# Patient Record
Sex: MALE | Race: WHITE | Employment: OTHER | URBAN - METROPOLITAN AREA
[De-identification: names, ages, dates, MRNs, and addresses within clinical notes are randomized per-mention and may not be internally consistent; named-entity substitution may affect disease eponyms.]

---

## 2018-05-25 ENCOUNTER — APPOINTMENT (OUTPATIENT)
Dept: CT IMAGING | Facility: CLINIC | Age: 48
End: 2018-05-25
Attending: EMERGENCY MEDICINE

## 2018-05-25 ENCOUNTER — HOSPITAL ENCOUNTER (EMERGENCY)
Facility: CLINIC | Age: 48
Discharge: HOME | End: 2018-05-25
Attending: EMERGENCY MEDICINE | Admitting: EMERGENCY MEDICINE

## 2018-05-25 VITALS
HEART RATE: 79 BPM | TEMPERATURE: 98.5 F | OXYGEN SATURATION: 96 % | WEIGHT: 178 LBS | SYSTOLIC BLOOD PRESSURE: 121 MMHG | BODY MASS INDEX: 23.81 KG/M2 | DIASTOLIC BLOOD PRESSURE: 67 MMHG | RESPIRATION RATE: 18 BRPM

## 2018-05-25 DIAGNOSIS — R68.84 JAW PAIN: ICD-10-CM

## 2018-05-25 DIAGNOSIS — K04.7 DENTAL INFECTION: ICD-10-CM

## 2018-05-25 PROCEDURE — 70487 CT MAXILLOFACIAL W/DYE: CPT

## 2018-05-25 PROCEDURE — 99285 EMERGENCY DEPT VISIT HI MDM: CPT | Mod: 25

## 2018-05-25 PROCEDURE — 96375 TX/PRO/DX INJ NEW DRUG ADDON: CPT

## 2018-05-25 PROCEDURE — 25000128 H RX IP 250 OP 636: Performed by: EMERGENCY MEDICINE

## 2018-05-25 PROCEDURE — 96376 TX/PRO/DX INJ SAME DRUG ADON: CPT

## 2018-05-25 PROCEDURE — 96365 THER/PROPH/DIAG IV INF INIT: CPT | Mod: 59

## 2018-05-25 PROCEDURE — 25000125 ZZHC RX 250: Performed by: EMERGENCY MEDICINE

## 2018-05-25 PROCEDURE — 96366 THER/PROPH/DIAG IV INF ADDON: CPT

## 2018-05-25 RX ORDER — KETOROLAC TROMETHAMINE 15 MG/ML
15 INJECTION, SOLUTION INTRAMUSCULAR; INTRAVENOUS ONCE
Status: COMPLETED | OUTPATIENT
Start: 2018-05-25 | End: 2018-05-25

## 2018-05-25 RX ORDER — CLINDAMYCIN PHOSPHATE 900 MG/50ML
900 INJECTION, SOLUTION INTRAVENOUS ONCE
Status: COMPLETED | OUTPATIENT
Start: 2018-05-25 | End: 2018-05-25

## 2018-05-25 RX ORDER — MORPHINE SULFATE 4 MG/ML
4 INJECTION, SOLUTION INTRAMUSCULAR; INTRAVENOUS
Status: DISCONTINUED | OUTPATIENT
Start: 2018-05-25 | End: 2018-05-25 | Stop reason: HOSPADM

## 2018-05-25 RX ORDER — HYDROCODONE BITARTRATE AND ACETAMINOPHEN 5; 325 MG/1; MG/1
1 TABLET ORAL EVERY 4 HOURS PRN
Qty: 18 TABLET | Refills: 0 | Status: SHIPPED | OUTPATIENT
Start: 2018-05-25 | End: 2021-08-01

## 2018-05-25 RX ORDER — IOPAMIDOL 755 MG/ML
500 INJECTION, SOLUTION INTRAVASCULAR ONCE
Status: COMPLETED | OUTPATIENT
Start: 2018-05-25 | End: 2018-05-25

## 2018-05-25 RX ORDER — CLINDAMYCIN HCL 150 MG
300 CAPSULE ORAL 3 TIMES DAILY
Qty: 42 CAPSULE | Refills: 0 | Status: SHIPPED | OUTPATIENT
Start: 2018-05-25 | End: 2018-06-01

## 2018-05-25 RX ADMIN — IOPAMIDOL 80 ML: 755 INJECTION, SOLUTION INTRAVENOUS at 04:42

## 2018-05-25 RX ADMIN — MORPHINE SULFATE 4 MG: 4 INJECTION INTRAVENOUS at 05:03

## 2018-05-25 RX ADMIN — SODIUM CHLORIDE 60 ML: 9 INJECTION, SOLUTION INTRAVENOUS at 04:42

## 2018-05-25 RX ADMIN — CLINDAMYCIN PHOSPHATE 900 MG: 18 INJECTION, SOLUTION INTRAVENOUS at 04:16

## 2018-05-25 RX ADMIN — KETOROLAC TROMETHAMINE 15 MG: 15 INJECTION, SOLUTION INTRAMUSCULAR; INTRAVENOUS at 06:26

## 2018-05-25 RX ADMIN — MORPHINE SULFATE 4 MG: 4 INJECTION INTRAVENOUS at 04:16

## 2018-05-25 ASSESSMENT — ENCOUNTER SYMPTOMS
SORE THROAT: 0
NAUSEA: 0
TROUBLE SWALLOWING: 1
VOMITING: 0
FEVER: 0
CHILLS: 0
FACIAL SWELLING: 0

## 2018-05-25 NOTE — ED AVS SNAPSHOT
" [facility] Emergency Department    [address]    [facility] [address]    Phone:  [phone]    Fax:  [phone]                                       Hardeep Lofton   MRN: [id number]    Department:  [facility] Emergency Department   Date of Visit:  5/25/2018           Patient Information     Date Of Birth          1970        Your diagnoses for this visit were:     Jaw pain     Dental infection        You were seen by Tio Phillip MD.      Follow-up Information     Go to Your dentist that removed wisdom teeth as we discussed this morning.  .        Discharge Instructions         Dental Abscess with Facial Cellulitis    A dental abscess is an infection at the base of a tooth. It means a pocket of pus has formed at the tip of a tooth root in your jaw bone. If the infection isn t treated, it can appear as a swelling on the gum near the tooth. More serious infections spread to the face. This causes your face to swell (cellulitis). This is a very serious condition. Once the swelling begins, it can spread quickly.  A dental abscess usually starts with a crack or cavity in a tooth. The pain is often made worse by drinking hot or cold beverages, or biting on hard foods. The pain may spread from the tooth to your ear or the area of your jaw on the same side.  Home care  Follow these tips when caring for yourself at home:    Don't have hot and cold foods and drinks. Your tooth may be sensitive to changes in temperature. Don t chew on the side of the infected tooth.    If your tooth is chipped or cracked, or if there is a large open cavity, put oil of cloves directly on the tooth to relieve pain. You can buy oil of cloves at drugstores. Some pharmacies carry an over-the-counter \"toothache kit.\" This contains a paste that you can put on the exposed tooth to make it less sensitive.    Put a cold pack on your jaw over the sore area to help reduce pain.    You may use " over-the-counter medication to ease pain, unless another medicine was prescribed. If you have chronic liver or kidney disease, talk with your health care provider before using acetaminophen or ibuprofen. Also talk with your provider if you ve had a stomach ulcer or GI bleeding.    An antibiotic will be prescribed. Take it exactly as directed. Don t miss any doses.  Follow-up care  Follow up with your dentist or an oral surgeon as advised. Severe cases of cellulitis must be checked again within 24 hours. Once an infection occurs in a tooth, it will continue to be a problem until the infection is drained. This is done through surgery or a root canal. Or you may need to have your tooth pulled.  Call 911  Call 911 if any of these occur:    Swelling spreads to the upper half of your face or neck    You eyelids begin to swell shut    Unusual drowsiness    Headache or a stiff neck    Weakness or fainting    Difficulty swallowing or breathing  When to seek medical advice  Call your healthcare provider right away if any of these occur:    Pain gets worse or spreads to your neck    Fever of 100.4 F (38 C) or higher, or as directed by your healthcare provider  Date Last Reviewed: 10/1/2016    2784-6099 [employer]. [address] [place] [address]. All rights reserved. This information is not intended as a substitute for professional medical care. Always follow your healthcare professional's instructions.          24 Hour Appointment Hotline       To make an appointment at any [facility], call [phone] ([phone]). If you don't have a family doctor or clinic, we will help you find one. [facility] are conveniently located to serve the needs of you and your family.             Review of your medicines      START taking        Dose / Directions Last dose taken    clindamycin 150 MG capsule   Commonly known as:  CLEOCIN   Dose:  300 mg   Quantity:  42 capsule        Take 2 capsules (300  mg) by mouth 3 times daily for 7 days   Refills:  0        HYDROcodone-acetaminophen 5-325 MG per tablet   Commonly known as:  NORCO   Dose:  1 tablet   Quantity:  18 tablet        Take 1 tablet by mouth every 4 hours as needed for pain   Refills:  0          Our records show that you are taking the medicines listed below. If these are incorrect, please call your family doctor or clinic.        Dose / Directions Last dose taken    azithromycin 500 MG tablet   Commonly known as:  ZITHROMAX   Quantity:  2 Tab        2 tablets once   Refills:  0        metroNIDAZOLE 500 MG tablet   Commonly known as:  FLAGYL   Quantity:  4 Tab        4 tablets times one   Refills:  0                Information about OPIOIDS     PRESCRIPTION OPIOIDS: WHAT YOU NEED TO KNOW   You have a prescription for an opioid (narcotic) pain medicine. Opioids can cause addiction. If you have a history of chemical dependency of any type, you are at a higher risk of becoming addicted to opioids. Only take this medicine after all other options have been tried. Take it for as short a time and as few doses as possible.     Do not:    Drive. If you drive while taking these medicines, you could be arrested for driving under the influence (DUI).    Operate heavy machinery    Do any other dangerous activities while taking these medicines.     Drink any alcohol while taking these medicines.      Take with any other medicines that contain acetaminophen. Read all labels carefully. Look for the word  acetaminophen  or  Tylenol.  Ask your pharmacist if you have questions or are unsure.    Store your pills in a secure place, locked if possible. We will not replace any lost or stolen medicine. If you don t finish your medicine, please throw away (dispose) as directed by your pharmacist. The [place] [employer] has more information about safe disposal: https://www.pca.[facility].[place].us/living-green/managing-unwanted-medications    All opioids tend to cause  constipation. Drink plenty of water and eat foods that have a lot of fiber, such as fruits, vegetables, prune juice, apple juice and high-fiber cereal. Take a laxative (Miralax, milk of magnesia, Colace, Senna) if you don t move your bowels at least every other day.         Prescriptions were sent or printed at these locations (2 Prescriptions)                   Other Prescriptions                Printed at Department/Unit printer (2 of 2)         clindamycin (CLEOCIN) 150 MG capsule               HYDROcodone-acetaminophen (NORCO) 5-325 MG per tablet                Procedures and tests performed during your visit     CT Maxillofacial w Contrast      Orders Needing Specimen Collection     None      Pending Results     Date and Time Order Name Status Description    5/25/2018 0353 CT Maxillofacial w Contrast In process             Pending Culture Results     No orders found from 5/23/2018 to 5/26/2018.            Pending Results Instructions     If you had any lab results that were not finalized at the time of your Discharge, you can call the ED Lab Result RN at [phone]. You will be contacted by this team for any positive Lab results or changes in treatment. The nurses are available 7 days a week from 10A to 6:30P.  You can leave a message 24 hours per day and they will return your call.        Test Results From Your Hospital Stay        5/25/2018  4:39 AM      Result not yet available     Exam Ended                Clinical Quality Measure: Blood Pressure Screening     Your blood pressure was checked while you were in the emergency department today. The last reading we obtained was  BP: 121/67 . Please read the guidelines below about what these numbers mean and what you should do about them.  If your systolic blood pressure (the top number) is less than 120 and your diastolic blood pressure (the bottom number) is less than 80, then your blood pressure is normal. There is nothing more that you need to do about  "it.  If your systolic blood pressure (the top number) is 120-139 or your diastolic blood pressure (the bottom number) is 80-89, your blood pressure may be higher than it should be. You should have your blood pressure rechecked within a year by a primary care provider.  If your systolic blood pressure (the top number) is 140 or greater or your diastolic blood pressure (the bottom number) is 90 or greater, you may have high blood pressure. High blood pressure is treatable, but if left untreated over time it can put you at risk for heart attack, stroke, or kidney failure. You should have your blood pressure rechecked by a primary care provider within the next 4 weeks.  If your provider in the emergency department today gave you specific instructions to follow-up with your doctor or provider even sooner than that, you should follow that instruction and not wait for up to 4 weeks for your follow-up visit.        Thank you for choosing [facility]       Thank you for choosing [facility] for your care. Our goal is always to provide you with excellent care. Hearing back from our patients is one way we can continue to improve our services. Please take a few minutes to complete the written survey that you may receive in the mail after you visit with us. Thank you!        [employer] Information     [employer] lets you send messages to your doctor, view your test results, renew your prescriptions, schedule appointments and more. To sign up, go to [email or web address] . Click on \"Log in\" on the left side of the screen, which will take you to the Welcome page. Then click on \"Sign up Now\" on the right side of the page.     You will be asked to enter the access code listed below, as well as some personal information. Please follow the directions to create your username and password.     Your access code is: [email or web address]  Expires: 2018  6:29 AM     Your access code will  in 90 days. If you need help or a new code, please call " your [place] clinic or [phone].        Care EveryWhere ID     This is your Care EveryWhere ID. This could be used by other organizations to access your [place] medical records  [id number]        Equal Access to Services     LOAN DILL : Carly Norton, wamariangelda luqadaha, qaybta kaalmada micheal, phillip kinsey. So [facility] [phone].    ATENCIÓN: Si habla español, tiene a martin disposición servicios gratuitos de asistencia lingüística. Llame al [phone].    We comply with applicable federal civil rights laws and [place] laws. We do not discriminate on the basis of race, color, national origin, age, disability, sex, sexual orientation, or gender identity.            After Visit Summary       This is your record. Keep this with you and show to your community pharmacist(s) and doctor(s) at your next visit.

## 2018-05-25 NOTE — DISCHARGE INSTRUCTIONS
"  Dental Abscess with Facial Cellulitis    A dental abscess is an infection at the base of a tooth. It means a pocket of pus has formed at the tip of a tooth root in your jaw bone. If the infection isn t treated, it can appear as a swelling on the gum near the tooth. More serious infections spread to the face. This causes your face to swell (cellulitis). This is a very serious condition. Once the swelling begins, it can spread quickly.  A dental abscess usually starts with a crack or cavity in a tooth. The pain is often made worse by drinking hot or cold beverages, or biting on hard foods. The pain may spread from the tooth to your ear or the area of your jaw on the same side.  Home care  Follow these tips when caring for yourself at home:    Don't have hot and cold foods and drinks. Your tooth may be sensitive to changes in temperature. Don t chew on the side of the infected tooth.    If your tooth is chipped or cracked, or if there is a large open cavity, put oil of cloves directly on the tooth to relieve pain. You can buy oil of cloves at [employer]. Some pharmacies carry an over-the-counter \"toothache kit.\" This contains a paste that you can put on the exposed tooth to make it less sensitive.    Put a cold pack on your jaw over the sore area to help reduce pain.    You may use over-the-counter medication to ease pain, unless another medicine was prescribed. If you have chronic liver or kidney disease, talk with your health care provider before using acetaminophen or ibuprofen. Also talk with your provider if you ve had a stomach ulcer or GI bleeding.    An antibiotic will be prescribed. Take it exactly as directed. Don t miss any doses.  Follow-up care  Follow up with your dentist or an oral surgeon as advised. Severe cases of cellulitis must be checked again within 24 hours. Once an infection occurs in a tooth, it will continue to be a problem until the infection is drained. This is done through surgery or a " root canal. Or you may need to have your tooth pulled.  Call 911  Call 911 if any of these occur:    Swelling spreads to the upper half of your face or neck    You eyelids begin to swell shut    Unusual drowsiness    Headache or a stiff neck    Weakness or fainting    Difficulty swallowing or breathing  When to seek medical advice  Call your healthcare provider right away if any of these occur:    Pain gets worse or spreads to your neck    Fever of 100.4 F (38 C) or higher, or as directed by your healthcare provider  Date Last Reviewed: 10/1/2016    3505-9762 [employer]. [address] [place] [address]. All rights reserved. This information is not intended as a substitute for professional medical care. Always follow your healthcare professional's instructions.

## 2018-05-25 NOTE — ED PROVIDER NOTES
History     Chief Complaint:  Dental Pain    HPI   Hardeep Lofton is a 47 year old male who presents with lower dental pain. The patient states that 3 days ago he had his bilateral wisdom teeth removed and following this he has had some post operative pain. However, this pain has progressively worsened to the point where he cannot fully open his jaw or swallow without provoking significant pain. He was unable to sleep due to pain and presents here this morning for evaluation. He has not been able to see his dentist as he is out of town currently. He was seen yesterday and was started on Azithromycin for a possible infection. He otherwise does not have significant facial swelling, fevers, sore throat, nausea, or vomiting. He has not had bleeding from the surgical site.    Allergies:  No known drug allergies     Medications:    Azithromycin      Past Medical History:    The patient does not have any past pertinent medical history.     Past Surgical History:    Left knee surgery     Family History:    History reviewed. No pertinent family history.      Social History:  Smoking Status: Never Smoker  Alcohol Use: Yes  Patient presents with alone.   Marital Status:        Review of Systems   Constitutional: Negative for chills and fever.   HENT: Positive for dental problem and trouble swallowing. Negative for facial swelling and sore throat.    Gastrointestinal: Negative for nausea and vomiting.       Physical Exam   Patient Vitals for the past 24 hrs:   BP Temp Temp src Pulse Resp SpO2 Weight   05/25/18 0515 - - - - - 94 % -   05/25/18 0339 121/67 98.5  F (36.9  C) Temporal 79 18 96 % 80.7 kg (178 lb)      Physical Exam  Constitutional: Patient is well appearing. No distress.  Head: Atraumatic.  Mouth/Throat: Oropharynx is clear and moist. No oropharyngeal exudate. Mouth opening limited to 2 cm. Left lower jaw pain greater than right. Minimal visualization of posterior molar healing beds. Does not appear to have  argenis abscess, bleeding, or drainage at this time. No sublingual swelling or tenderness.  Eyes: Conjunctivae and EOM are normal. No scleral icterus.  Neck: Normal range of motion. Neck supple.   Cardiovascular: Normal rate, regular rhythm, normal heart sounds and intact distal pulses.   Pulmonary/Chest: Breath sounds normal. No respiratory distress.  Abdominal: Soft. Bowel sounds are normal. No distension. No tenderness. No rebound or guarding.   Musculoskeletal: Normal range of motion. No edema or tenderness.   Neurological: Alert and orientated to person, place, and time. No observable focal neuro deficit  Skin: Warm and dry. No rash noted. Not diaphoretic.        Emergency Department Course     Imaging:  Radiographic findings were communicated with the patient who voiced understanding of the findings.    CT-scan Maxillofacial w/ contrast:  1. Evidence of recent bilateral lower wisdom teeth extraction.  2. Extensive edema suggestive of infectious/inflammatory process is noted involving the surrounding soft tissues with extension into the left submandibular space, the parapharyngeal space and along the left aspect of the naso-,aida-, and hypo-pharyngeal walls. There is some effacement of the airway which remains patent.   3. No delayed imaging was performed limiting evaluation for abscess, however, extending from the lingual cortex at the site of the left lower wisdom tooth extraction there is a somewhat concentrated area of low-attenuation change with some mild peripheral enhancement. No definite well-formed capsule is identified at this time to suggest a mature abscess but possibility of an early or developing abscess should be considered.   4. There is reactive lymphadenopathy along the cervical chains, left greater than right.   As read by Radiology.      Interventions:  0416 - Clindamycin 900 mg IV  0416 - Morphine 4 mg IV   0503 - Morphine 4 mg IV  0626 - Toradol 15 mg IV    Emergency Department Course:  Past  medical records, nursing notes, and vitals reviewed.  0348: I performed an exam of the patient and obtained history, as documented above.   The patient was sent for a CT-scan while in the emergency department, findings above.     0620: I rechecked the patient. Explained findings to patient.     I rechecked the patient. Findings and plan explained to the Patient. Patient discharged home with instructions regarding supportive care, medications, and reasons to return. The importance of close follow-up was reviewed.      Impression & Plan      Medical Decision Making:  Post wisdom teeth extraction.  No airway compromise but some mild trismus.  CT of face as above does show probably secondary infection without argenis abscess.  We will switch him form z pack to clinda.  IV dose here and pain controlled here.  [place] dentist available or [facility] [place] feels comfortable with home with plan and will follow up with dentist this am when clinic opens.    Diagnosis:    ICD-10-CM   1. Jaw pain R68.84   2. Dental infection K04.7     Discharge Medications:  New Prescriptions    CLINDAMYCIN (CLEOCIN) 150 MG CAPSULE    Take 2 capsules (300 mg) by mouth 3 times daily for 7 days    HYDROCODONE-ACETAMINOPHEN (NORCO) 5-325 MG PER TABLET    Take 1 tablet by mouth every 4 hours as needed for pain       Cody Parker  5/25/2018   [facility] EMERGENCY DEPARTMENT  I, Cody Parker, am serving as a scribe at 3:48 AM on 5/25/2018 to document services personally performed by Tio Phillip MD based on my observations and the provider's statements to me.       Tio Phillip MD  05/25/18 6957

## 2018-05-25 NOTE — ED NOTES
47-year-old male presents to the ER with complaints of dental pain after having his wisdom teeth taken out on Tuesday. Pt states he hasn't slept for the past three days.

## 2018-05-25 NOTE — ED AVS SNAPSHOT
[facility] Emergency Department    [address]    [facility] [address]    Phone:  [phone]    Fax:  [phone]                                       Hardeep Lofton   MRN: [id number]    Department:  [facility] Emergency Department   Date of Visit:  5/25/2018           After Visit Summary Signature Page     I have received my discharge instructions, and my questions have been answered. I have discussed any challenges I see with this plan with the nurse or doctor.    ..........................................................................................................................................  Patient/Patient Representative Signature      ..........................................................................................................................................  Patient Representative Print Name and Relationship to Patient    ..................................................               ................................................  Date                                            Time    ..........................................................................................................................................  Reviewed by Signature/Title    ...................................................              ..............................................  Date                                                            Time

## 2021-07-31 PROCEDURE — 80048 BASIC METABOLIC PNL TOTAL CA: CPT | Performed by: EMERGENCY MEDICINE

## 2021-07-31 PROCEDURE — 99285 EMERGENCY DEPT VISIT HI MDM: CPT | Mod: 25

## 2021-07-31 PROCEDURE — 96376 TX/PRO/DX INJ SAME DRUG ADON: CPT

## 2021-07-31 PROCEDURE — 36415 COLL VENOUS BLD VENIPUNCTURE: CPT | Performed by: EMERGENCY MEDICINE

## 2021-07-31 PROCEDURE — 85025 COMPLETE CBC W/AUTO DIFF WBC: CPT | Performed by: EMERGENCY MEDICINE

## 2021-07-31 PROCEDURE — 96374 THER/PROPH/DIAG INJ IV PUSH: CPT

## 2021-07-31 PROCEDURE — 96375 TX/PRO/DX INJ NEW DRUG ADDON: CPT

## 2021-07-31 PROCEDURE — 96361 HYDRATE IV INFUSION ADD-ON: CPT

## 2021-07-31 RX ORDER — KETOROLAC TROMETHAMINE 15 MG/ML
15 INJECTION, SOLUTION INTRAMUSCULAR; INTRAVENOUS ONCE
Status: COMPLETED | OUTPATIENT
Start: 2021-08-01 | End: 2021-08-01

## 2021-08-01 ENCOUNTER — HOSPITAL ENCOUNTER (EMERGENCY)
Facility: CLINIC | Age: 51
Discharge: HOME | End: 2021-08-01
Attending: EMERGENCY MEDICINE | Admitting: EMERGENCY MEDICINE
Payer: COMMERCIAL

## 2021-08-01 ENCOUNTER — APPOINTMENT (OUTPATIENT)
Dept: CT IMAGING | Facility: CLINIC | Age: 51
End: 2021-08-01
Attending: EMERGENCY MEDICINE
Payer: COMMERCIAL

## 2021-08-01 VITALS
SYSTOLIC BLOOD PRESSURE: 129 MMHG | DIASTOLIC BLOOD PRESSURE: 85 MMHG | HEART RATE: 54 BPM | OXYGEN SATURATION: 97 % | TEMPERATURE: 98.4 F | RESPIRATION RATE: 20 BRPM

## 2021-08-01 DIAGNOSIS — N20.1 URETERAL STONE: ICD-10-CM

## 2021-08-01 LAB
ALBUMIN UR-MCNC: 20 MG/DL
ANION GAP SERPL CALCULATED.3IONS-SCNC: 5 MMOL/L (ref 3–14)
APPEARANCE UR: CLEAR
BASOPHILS # BLD AUTO: 0 10E3/UL (ref 0–0.2)
BASOPHILS NFR BLD AUTO: 0 %
BILIRUB UR QL STRIP: NEGATIVE
BUN SERPL-MCNC: 28 MG/DL (ref 7–30)
CALCIUM SERPL-MCNC: 9.1 MG/DL (ref 8.5–10.1)
CHLORIDE BLD-SCNC: 105 MMOL/L (ref 94–109)
CO2 SERPL-SCNC: 28 MMOL/L (ref 20–32)
COLOR UR AUTO: YELLOW
CREAT SERPL-MCNC: 1.14 MG/DL (ref 0.66–1.25)
EOSINOPHIL # BLD AUTO: 0.1 10E3/UL (ref 0–0.7)
EOSINOPHIL NFR BLD AUTO: 2 %
ERYTHROCYTE [DISTWIDTH] IN BLOOD BY AUTOMATED COUNT: 11.9 % (ref 10–15)
GFR SERPL CREATININE-BSD FRML MDRD: 74 ML/MIN/1.73M2
GLUCOSE BLD-MCNC: 115 MG/DL (ref 70–99)
GLUCOSE UR STRIP-MCNC: NEGATIVE MG/DL
HCT VFR BLD AUTO: 45.6 % (ref 40–53)
HGB BLD-MCNC: 15.1 G/DL (ref 13.3–17.7)
HGB UR QL STRIP: ABNORMAL
HOLD SPECIMEN: NORMAL
IMM GRANULOCYTES # BLD: 0 10E3/UL
IMM GRANULOCYTES NFR BLD: 0 %
KETONES UR STRIP-MCNC: NEGATIVE MG/DL
LEUKOCYTE ESTERASE UR QL STRIP: NEGATIVE
LYMPHOCYTES # BLD AUTO: 2 10E3/UL (ref 0.8–5.3)
LYMPHOCYTES NFR BLD AUTO: 30 %
MCH RBC QN AUTO: 32.1 PG (ref 26.5–33)
MCHC RBC AUTO-ENTMCNC: 33.1 G/DL (ref 31.5–36.5)
MCV RBC AUTO: 97 FL (ref 78–100)
MONOCYTES # BLD AUTO: 0.7 10E3/UL (ref 0–1.3)
MONOCYTES NFR BLD AUTO: 11 %
MUCOUS THREADS #/AREA URNS LPF: PRESENT /LPF
NEUTROPHILS # BLD AUTO: 3.9 10E3/UL (ref 1.6–8.3)
NEUTROPHILS NFR BLD AUTO: 57 %
NITRATE UR QL: NEGATIVE
NRBC # BLD AUTO: 0 10E3/UL
NRBC BLD AUTO-RTO: 0 /100
PH UR STRIP: 5.5 [PH] (ref 5–7)
PLATELET # BLD AUTO: 229 10E3/UL (ref 150–450)
POTASSIUM BLD-SCNC: 4.3 MMOL/L (ref 3.4–5.3)
RBC # BLD AUTO: 4.7 10E6/UL (ref 4.4–5.9)
RBC URINE: >182 /HPF
SODIUM SERPL-SCNC: 138 MMOL/L (ref 133–144)
SP GR UR STRIP: 1.03 (ref 1–1.03)
UROBILINOGEN UR STRIP-MCNC: NORMAL MG/DL
WBC # BLD AUTO: 6.8 10E3/UL (ref 4–11)
WBC URINE: 5 /HPF

## 2021-08-01 PROCEDURE — 250N000011 HC RX IP 250 OP 636: Performed by: EMERGENCY MEDICINE

## 2021-08-01 PROCEDURE — 81001 URINALYSIS AUTO W/SCOPE: CPT | Performed by: EMERGENCY MEDICINE

## 2021-08-01 PROCEDURE — 74176 CT ABD & PELVIS W/O CONTRAST: CPT

## 2021-08-01 PROCEDURE — 258N000003 HC RX IP 258 OP 636: Performed by: EMERGENCY MEDICINE

## 2021-08-01 RX ORDER — ONDANSETRON 4 MG/1
4 TABLET, ORALLY DISINTEGRATING ORAL EVERY 8 HOURS PRN
Qty: 10 TABLET | Refills: 0 | Status: SHIPPED | OUTPATIENT
Start: 2021-08-01 | End: 2021-08-04

## 2021-08-01 RX ORDER — HYDROCODONE BITARTRATE AND ACETAMINOPHEN 5; 325 MG/1; MG/1
1-2 TABLET ORAL EVERY 4 HOURS PRN
Qty: 10 TABLET | Refills: 0 | Status: SHIPPED | OUTPATIENT
Start: 2021-08-01 | End: 2021-08-04

## 2021-08-01 RX ORDER — TAMSULOSIN HYDROCHLORIDE 0.4 MG/1
0.4 CAPSULE ORAL DAILY
Qty: 10 CAPSULE | Refills: 0 | Status: SHIPPED | OUTPATIENT
Start: 2021-08-01 | End: 2021-08-11

## 2021-08-01 RX ORDER — ONDANSETRON 2 MG/ML
4 INJECTION INTRAMUSCULAR; INTRAVENOUS ONCE
Status: COMPLETED | OUTPATIENT
Start: 2021-08-01 | End: 2021-08-01

## 2021-08-01 RX ORDER — HYDROMORPHONE HYDROCHLORIDE 1 MG/ML
0.5 INJECTION, SOLUTION INTRAMUSCULAR; INTRAVENOUS; SUBCUTANEOUS
Status: DISCONTINUED | OUTPATIENT
Start: 2021-08-01 | End: 2021-08-01 | Stop reason: HOSPADM

## 2021-08-01 RX ADMIN — KETOROLAC TROMETHAMINE 15 MG: 15 INJECTION, SOLUTION INTRAMUSCULAR; INTRAVENOUS at 00:31

## 2021-08-01 RX ADMIN — HYDROMORPHONE HYDROCHLORIDE 0.5 MG: 1 INJECTION, SOLUTION INTRAMUSCULAR; INTRAVENOUS; SUBCUTANEOUS at 01:27

## 2021-08-01 RX ADMIN — ONDANSETRON 4 MG: 2 INJECTION INTRAMUSCULAR; INTRAVENOUS at 01:27

## 2021-08-01 RX ADMIN — HYDROMORPHONE HYDROCHLORIDE 0.5 MG: 1 INJECTION, SOLUTION INTRAMUSCULAR; INTRAVENOUS; SUBCUTANEOUS at 03:33

## 2021-08-01 RX ADMIN — SODIUM CHLORIDE 1000 ML: 9 INJECTION, SOLUTION INTRAVENOUS at 01:27

## 2021-08-01 ASSESSMENT — ENCOUNTER SYMPTOMS
FLANK PAIN: 1
NAUSEA: 1
SHORTNESS OF BREATH: 0
DIFFICULTY URINATING: 1

## 2021-08-01 NOTE — ED PROVIDER NOTES
History   Chief Complaint:  Flank Pain       The history is provided by the patient.      Hardeep Lofton is a 51 year old male with who presents with flank pain. Patient states sudden onset of left flank pain that started about 45 minutes prior to arrival. Pain radiates to the left groin. Patient also notes decreased urine and nausea. Denies shortness of breath or chest pain. No history of kidney stone.       Review of Systems   Respiratory: Negative for shortness of breath.    Cardiovascular: Negative for chest pain.   Gastrointestinal: Positive for nausea.   Genitourinary: Positive for difficulty urinating and flank pain.   All other systems reviewed and are negative.        Allergies:  The patient has no known allergies.     Medications:  Adderall    Past Medical History:    ADHD     Past Surgical History:    Knee surgery    Family History:    Father: Alzheimer's disease, hyperlipidemia     Social History:  Patient was accompanied to the ED.     Physical Exam     Patient Vitals for the past 24 hrs:   BP Temp Temp src Pulse Resp SpO2   08/01/21 0115 (!) 140/96 -- -- 52 -- 98 %   08/01/21 0100 (!) 131/90 -- -- 53 -- 99 %   07/31/21 2326 123/75 98.4  F (36.9  C) Oral 66 20 99 %       Physical Exam    Gen: alert  HEENT: PERRL, oropharynx clear  Neck: normal ROM  CV: RRR, no murmurs  Pulm: breath sounds equal, lungs clear  Abd: Soft, mild LLQ tenderness, otherwise nontender  Back: no evidence of injury, no cva tenderness  MSK: no deformity, moves all extremities  Skin: no rash  Neuro: alert, appropriate conversation and interaction    Emergency Department Course     Imaging:  Abd/pelvis CT no contrast - Stone Protocol  1.  Moderate left hydronephrosis caused by a 4 mm ureterovesical junction stone.  2.  Single small left intrarenal stone.  Reading per radiology.     Laboratory:   CBC: WBC: 6.8, HGB: 15.1, PLT: 229    BMP: Glucose 115 (H) o/w WNL (Creatinine: 1.14)    UA: Blood: Large (A), Protein Albumin: 20 (A),  RBC: >182 (H), Mucous: Present, o/w Negative      Emergency Department Course:    Reviewed:  I reviewed nursing notes, vitals, past medical history and care everywhere    Assessments:  0117 I obtained history and examined the patient as noted above.   0229 I rechecked the patient and explained findings.     Interventions:  0031 Toradol 15 mg IV  0127 Dilaudid 0.5 mg IV  0127 Zofran 4 mg IV  012 NS 1L IV    Disposition:  The patient was discharged to home.       Impression & Plan     CMS Diagnoses: None    Medical Decision Making:  Evaluation today was consistent with nephrolithiasis and renal colic.  This explains the patient's pain.  Based on hx and exam and the above studies, I do not feel that there is evidence of other acute intraabdominal process at this time.  CT scan showed ueteral stone.  UA was obtained and no overt signs of infection are present.  Given the presence of the kidney stone, urine culture was sent.  The patient received pain control, antiemetic and IV fluids as documented above.  Pain and nausea was well controlled with these measures.  The patient was able to tolerate PO.  Based on the patient's good response to symptomatic control, I feel that this patient can be managed expectantly with close outpatient follow up within the next 2 days.   The patient was instructed to return to the emergency department for uncontrolled pain, fever or inability to tolerate oral liquids.  Prescriptions for Norco, Zofran, and Flomax were given.    Diagnosis:    ICD-10-CM    1. Ureteral stone  N20.1        Discharge Medications:  New Prescriptions    HYDROCODONE-ACETAMINOPHEN (NORCO) 5-325 MG TABLET    Take 1-2 tablets by mouth every 4 hours as needed for severe pain    ONDANSETRON (ZOFRAN ODT) 4 MG ODT TAB    Take 1 tablet (4 mg) by mouth every 8 hours as needed for nausea or vomiting    TAMSULOSIN (FLOMAX) 0.4 MG CAPSULE    Take 1 capsule (0.4 mg) by mouth daily for 10 doses       Scribe Disclosure:  Antonella FLORIAN  Mayra, am serving as a scribe at 12:58 AM on 8/1/2021 to document services personally performed by Juliana Bautista MD, based on my observations and the provider's statements to me.            Juliana Bautista MD  08/01/21 0430

## 2021-08-01 NOTE — ED NOTES
Pt provided w/ strainer for straining urine for stone at home, process explained to pt, verbalized understanding.

## 2021-08-01 NOTE — ED TRIAGE NOTES
Pt states sudden onset of left flank pain radiating to groin approx 45 minutes pta. Pt also notes nausea. ABCs intact GCS 15

## 2021-08-01 NOTE — DISCHARGE INSTRUCTIONS
Take ibuprofen 600 mg 3x per day.  This will provide both pain control and fight against inflammation.  You were given a prescription for norco (hydrocodone and acetaminophen).  This is a strong, narcotic pain medication.  It may cause drowsiness and mild changes in cognition.  Do not drive or operate machinery while taking this medication.  Do not mix this medication with alcohol or other sedating medications.  This medication contains tylenol (acetaminophen) therefore do not take additional tylenol (acetaminophen) while taking norco.  This medication can cause constipation.  The use of over the counter laxatives and stool softeners can help prevent this.      Discharge Instructions  Kidney Stones    Kidney stones are a common problem that can cause a lot of pain but fortunately are usually not dangerous. Kidney stones form in the kidney and then can cause a blockage (obstruction) of the flow of urine from the kidney which leads to pain. Most patients can manage kidney stones at home (without a hospital stay).  However, sometimes your condition may be worse than it seemed at first, or may get worse with time. Most kidney stones will pass on their own, but occasionally stones may need to be removed by an urologist.    Generally, every Emergency Department visit should have a follow-up clinic visit with either a primary or a specialty clinic/provider. Please follow-up as instructed by your emergency provider today.      Return to the Emergency Department if:  Your pain is not controlled despite the medications provided or recommended.  You are vomiting (throwing up) and cannot keep fluids or medications down.  You develop a fever (>100.4 F).  You feel much more ill or develop new symptoms.  What can I do to help myself?  Be sure to drink plenty of fluids.  If instructed to do so, strain your urine (pee) with the urine strainer you were provided with today. Your stone may look like a grain of sand or a small pebble.  Collect any stones in the cup provided and bring to your follow-up appointment.  Staying active is good, and may help the stone to pass. You may do whatever you feel up to doing without restrictions.   Treatment:  Non-steroidal anti-inflammatory drugs (NSAIDs). This includes prescription medicines like Toradol  (ketorolac) and non-prescription medicines like Advil  (ibuprofen) and Nuprin  (ibuprofen) and Naproxen. These pain relievers are very effective for kidney stones.  Nausea (sick to your stomach) medication.  Nausea and vomiting are common with kidney stones, so your provider may send you home with medicine for this.   Flomax  (tamsulosin). This medicine is sometimes used for men with prostate problems, but also can help kidney stones to pass. Its effectiveness is controversial or questionable so it is prescribed in certain situations. This medicine can lower blood pressure, and you may feel faint/lightheaded, especially when you first stand up. Be sure to get up gradually, sit down if you feel faint, and avoid activity where feeling faint would be dangerous, such as climbing ladders.  If you were given a prescription for medicine here today, be sure to read all of the information (including the package insert) that comes with your prescription.  This will include important information about the medicine, its side effects, and any warnings that you need to know about.  The pharmacist who fills the prescription can provide more information and answer questions you may have about the medicine.  If you have questions or concerns that the pharmacist cannot address, please call or return to the Emergency Department.   Remember that you can always come back to the Emergency Department if you are not able to see your regular provider in the amount of time listed above, if you get any new symptoms, or if there is anything that worries you.

## 2021-08-19 ENCOUNTER — HOSPITAL ENCOUNTER (EMERGENCY)
Facility: CLINIC | Age: 51
Discharge: HOME | End: 2021-08-19
Attending: EMERGENCY MEDICINE | Admitting: EMERGENCY MEDICINE
Payer: COMMERCIAL

## 2021-08-19 VITALS
OXYGEN SATURATION: 100 % | DIASTOLIC BLOOD PRESSURE: 78 MMHG | HEART RATE: 60 BPM | SYSTOLIC BLOOD PRESSURE: 114 MMHG | RESPIRATION RATE: 18 BRPM | TEMPERATURE: 98.1 F

## 2021-08-19 DIAGNOSIS — T14.8XXA ABRASION: ICD-10-CM

## 2021-08-19 DIAGNOSIS — S70.12XA THIGH HEMATOMA, LEFT, INITIAL ENCOUNTER: ICD-10-CM

## 2021-08-19 PROCEDURE — 99283 EMERGENCY DEPT VISIT LOW MDM: CPT

## 2021-08-19 PROCEDURE — 250N000013 HC RX MED GY IP 250 OP 250 PS 637: Performed by: EMERGENCY MEDICINE

## 2021-08-19 RX ORDER — ACETAMINOPHEN 500 MG
1000 TABLET ORAL ONCE
Status: COMPLETED | OUTPATIENT
Start: 2021-08-19 | End: 2021-08-19

## 2021-08-19 RX ADMIN — ACETAMINOPHEN 1000 MG: 500 TABLET, FILM COATED ORAL at 16:54

## 2021-08-19 ASSESSMENT — ENCOUNTER SYMPTOMS
BACK PAIN: 0
NECK PAIN: 0
ABDOMINAL PAIN: 0
MYALGIAS: 1

## 2021-08-19 NOTE — ED NOTES
Pt able to ambulate without difficulty, does endorse some pain in the left upper thigh area but normal gait present. MD notified.

## 2021-08-19 NOTE — ED TRIAGE NOTES
"Fell from 12 foot ladder. Branch \"impaled\" jeans and groin. C/O groin pain. Denies LOC. Denies C-Spine tenderness  "

## 2021-08-19 NOTE — ED NOTES
Ambulated patient, tolerated well. Per patient, leg is sore but tolerable. Ice pack applied to hematoma on leg. RN notified. CMS intact before and after ACE wrap application, instructed patient on CMS.

## 2021-08-19 NOTE — ED PROVIDER NOTES
"  History   Chief Complaint:  Fall       HPI   Hardeep Lofton is a 51 year old male with history of ADHD who presents with a fall from a ladder with his head height at approximately 12 feet. He states that his ladder was leaning against a tree branch when the branch broke and he fell backwards. During his fall, he reports that another broken branch \"impaled\" his left leg but the branch did not break the skin. After the fall, he states that he became diaphoretic and felt lightheaded due to the pain but no loss of consciousness or head injury occurred. Here in the ED, he states that his left leg feels \"warm\" but is not numb. He is ambulatory and he did drive himself here. Hardeep denies abdominal pain, neck pain, back pain, chest pain. He denies numbness or tingling or weakness of the affected extremity. He denies involvement of the genital region. He denies tobacco use and heavy alcohol use. He does not take anticoagulants.    Review of Systems   Cardiovascular: Negative for chest pain.   Gastrointestinal: Negative for abdominal pain.   Musculoskeletal: Positive for myalgias. Negative for back pain and neck pain.   All other systems reviewed and are negative.    Allergies:  The patient has no known allergies.     Medications:  Adderall    Past Medical History:    ADHD    Past Surgical History:    Meniscus surgery, left    Family History:    Father: Alzheimer's disease, hyperlipidemia      Social History:  Patient presents to the ED alone.  Patient presents to the ED via car.    Physical Exam     Patient Vitals for the past 24 hrs:   BP Temp Temp src Pulse Resp SpO2   08/19/21 1700 114/78 -- -- 60 -- 100 %   08/19/21 1645 116/73 -- -- 61 -- 97 %   08/19/21 1630 103/67 -- -- 57 -- 98 %   08/19/21 1621 114/75 98.1  F (36.7  C) Temporal 67 18 93 %       Physical Exam  General: Adult male sitting upright  Eyes: PERRL, Conjunctive within normal limits  HENT: Firm somewhat mobile mass over the left posterior scalp, nontender, " no overlying skin abnormality. Moist mucous membranes, oropharynx clear.   Neck: Nontender. Normal AROM.  CV: Normal S1S2, no murmur, rub or gallop. Regular rate and rhythm. DP and PT pulses intact left foot and ankle.  Resp: Clear to auscultation bilaterally, no wheezes, rales or rhonchi. Normal respiratory effort.  GI: Abdomen is soft, nontender and nondistended. No palpable masses. No rebound or guarding.  MSK: Tender hematoma, nonpulsatile, tennis ball sized over the left medial thigh, not into the scrotum or inguinal region.  No edema. Nontender. Normal active range of motion of all 4 extremities, specifically including the left leg.  5 out of 5 strength diffusely in the left lower extremity..  : No penile or scrotal abnormalities visible  Skin: Warm and dry. Ecchymosis and superficial abrasion over the left medial thigh. Superficial linear abrasion over the left forearm.   Neuro: Alert and oriented. Responds appropriately to all questions and commands. No focal findings appreciated. Normal muscle tone.  Sensation intact to light touch over all dermatomes of the left lower extremity.  Psych: Normal mood and affect. Pleasant.    Emergency Department Course:    Reviewed:  I reviewed nursing notes, vitals, past medical history and care everywhere    Assessments:  1629 I obtained history and examined the patient as noted above.     1705 I rechecked the patient and explained findings. He is feeling well and able to walk around. He denies any new symptoms and is ready for discharge.    Interventions:  1654 Tylenol 1 g PO    Disposition:  The patient was discharged to home.     Impression & Plan     Medical Decision Making:  Hardeep Lofton is a 51-year-old male who presents to the emergency department after falling from a ladder.  The area of concern was his left thigh.  Did not hit his head or lose consciousness and currently is denying any pain elsewhere.  He also is neurovascularly intact.  He has a hematoma of the  left medial thigh.  It does not go into the inguinal region or the genital region.  An Ace wrap was placed for compression and he ambulated without difficulty.  There is no indication for imaging at this time as my suspicion for major arterial/venous injury or bony injury is low.  He feels comfortable with this plan.  He understands the need to return immediately to the emergency department should symptoms worsen.  He is otherwise recommended to follow-up with his primary clinic within the next 3 to 5 days for reassessment, on the earlier side if needed.  He may use Tylenol as he for pain, rest, ice, compression and elevation.  All questions were answered prior to discharge.    Diagnosis:    ICD-10-CM    1. Thigh hematoma, left, initial encounter  S70.12XA    2. Abrasion  T14.8XXA        Scribe Disclosure:  I, Ella Jacobo, am serving as a scribe at 4:29 PM on 8/19/2021 to document services personally performed by Lyubov Khalil MD based on my observations and the provider's statements to me.       Lyubov Khalil MD  08/19/21 2003